# Patient Record
Sex: FEMALE | Race: WHITE | URBAN - METROPOLITAN AREA
[De-identification: names, ages, dates, MRNs, and addresses within clinical notes are randomized per-mention and may not be internally consistent; named-entity substitution may affect disease eponyms.]

---

## 2022-07-31 ENCOUNTER — WEB ENCOUNTER (OUTPATIENT)
Dept: URBAN - METROPOLITAN AREA CLINIC 96 | Facility: CLINIC | Age: 77
End: 2022-07-31

## 2022-08-02 ENCOUNTER — DASHBOARD ENCOUNTERS (OUTPATIENT)
Age: 77
End: 2022-08-02

## 2022-08-02 ENCOUNTER — OFFICE VISIT (OUTPATIENT)
Dept: URBAN - METROPOLITAN AREA CLINIC 96 | Facility: CLINIC | Age: 77
End: 2022-08-02
Payer: COMMERCIAL

## 2022-08-02 VITALS
SYSTOLIC BLOOD PRESSURE: 130 MMHG | TEMPERATURE: 97.9 F | BODY MASS INDEX: 23.39 KG/M2 | OXYGEN SATURATION: 95 % | HEIGHT: 63 IN | HEART RATE: 65 BPM | WEIGHT: 132 LBS | RESPIRATION RATE: 18 BRPM | DIASTOLIC BLOOD PRESSURE: 70 MMHG

## 2022-08-02 DIAGNOSIS — Z85.3 PERSONAL HISTORY OF BREAST CANCER: ICD-10-CM

## 2022-08-02 DIAGNOSIS — Z12.11 ENCOUNTER FOR SCREENING COLONOSCOPY: ICD-10-CM

## 2022-08-02 DIAGNOSIS — K57.90 DIVERTICULOSIS: ICD-10-CM

## 2022-08-02 PROBLEM — 397881000: Status: ACTIVE | Noted: 2022-08-02

## 2022-08-02 PROCEDURE — 99203 OFFICE O/P NEW LOW 30 MIN: CPT | Performed by: INTERNAL MEDICINE

## 2022-08-02 RX ORDER — ATORVASTATIN CALCIUM 20 MG/1
1 TABLET TABLET, FILM COATED ORAL ONCE A DAY
Refills: 0 | Status: ACTIVE | COMMUNITY
Start: 2017-06-09

## 2022-08-02 RX ORDER — TRAMADOL HYDROCHLORIDE 50 MG/1
TABLET ORAL
Qty: 0 | Refills: 0 | Status: ACTIVE | COMMUNITY
Start: 2017-05-30

## 2022-08-02 RX ORDER — PNV NO.95/FERROUS FUM/FOLIC AC 28MG-0.8MG
AS DIRECTED TABLET ORAL
Status: ACTIVE | COMMUNITY

## 2022-08-02 RX ORDER — METOPROLOL TARTRATE 25 MG/1
1 TABLET WITH FOOD TABLET, FILM COATED ORAL TWICE A DAY
Refills: 0 | Status: ACTIVE | COMMUNITY
Start: 2017-04-07

## 2022-08-02 RX ORDER — ANASTROZOLE 1 MG/1
TABLET ORAL
Qty: 0 | Refills: 0 | Status: ON HOLD | COMMUNITY
Start: 2017-05-10

## 2022-08-02 RX ORDER — LEVOTHYROXINE SODIUM 25 UG/1
1 TABLET IN THE MORNING ON AN EMPTY STOMACH TABLET ORAL ONCE A DAY
Status: ACTIVE | COMMUNITY

## 2022-08-02 RX ORDER — BIOTIN 10 MG
1 TABLET TABLET ORAL ONCE A DAY
Status: ACTIVE | COMMUNITY

## 2022-08-02 RX ORDER — ZOLPIDEM TARTRATE 5 MG/1
1 TABLET AT BEDTIME AS NEEDED TABLET, FILM COATED ORAL ONCE A DAY
Refills: 0 | Status: ACTIVE | COMMUNITY
Start: 2017-04-04

## 2022-08-02 RX ORDER — CHOLECALCIFEROL (VITAMIN D3) 25 MCG
1 TABLET TABLET ORAL ONCE A DAY
Status: ACTIVE | COMMUNITY

## 2022-08-02 RX ORDER — B-COMPLEX WITH VITAMIN C
AS DIRECTED TABLET ORAL
Status: ACTIVE | COMMUNITY

## 2022-08-02 RX ORDER — POLYETHYLENE GLYCOL 3350, SODIUM SULFATE, SODIUM CHLORIDE, POTASSIUM CHLORIDE, ASCORBIC ACID, SODIUM ASCORBATE 140-9-5.2G
AS DIRECTED KIT ORAL ONCE
Qty: 1 | Refills: 0 | OUTPATIENT
Start: 2022-08-02 | End: 2022-08-03

## 2022-08-02 RX ORDER — GABAPENTIN 100 MG/1
1 CAPSULE CAPSULE ORAL ONCE A DAY
Status: ACTIVE | COMMUNITY

## 2022-08-02 RX ORDER — VENLAFAXINE HYDROCHLORIDE 150 MG/1
1 CAPSULE WITH FOOD CAPSULE, EXTENDED RELEASE ORAL ONCE A DAY
Status: ACTIVE | COMMUNITY

## 2022-08-02 RX ORDER — CALCIUM CARBONATE 500(1250)
1 TABLET WITH MEALS TABLET ORAL TWICE A DAY
Status: ACTIVE | COMMUNITY

## 2022-08-02 NOTE — PHYSICAL EXAM CHEST:
chest wall non-tender, breathing is unlabored without accessory muscle use, normal breath sounds, scars

## 2022-08-02 NOTE — HPI-TODAY'S VISIT:
Patient being seen in consultation as requested by Dr. Geoff Stafford for screening colonoscopy. A copy of this document will be sent to the requesting physician.  Patient remotely seen for screening colonoscopy performed by myself 7/12/2017.  Few small mouth diverticula in the sigmoid colon.  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  Biopsies of lipoma unremarkable.  Repeat colonoscopy was recommended in 5 years given personal history of breast cancer (2022).  She reports will have occasional diarrhea with peaches,and certain foods. Not a chronic daily issues, only with certain foods. No melena, no rectal bleeding, no unintentional weight loss. No family hx of IBD/GI CA/celiac disease.  UTD with primary MD with recent physical exam.

## 2022-08-19 PROBLEM — 429087003: Status: ACTIVE | Noted: 2022-08-02

## 2022-09-16 ENCOUNTER — OFFICE VISIT (OUTPATIENT)
Dept: URBAN - METROPOLITAN AREA SURGERY CENTER 18 | Facility: SURGERY CENTER | Age: 77
End: 2022-09-16
Payer: COMMERCIAL

## 2022-09-16 DIAGNOSIS — Z12.11 COLON CANCER SCREENING: ICD-10-CM

## 2022-09-16 PROCEDURE — 45378 DIAGNOSTIC COLONOSCOPY: CPT | Performed by: INTERNAL MEDICINE

## 2022-09-16 PROCEDURE — G8907 PT DOC NO EVENTS ON DISCHARG: HCPCS | Performed by: INTERNAL MEDICINE

## 2025-02-25 ENCOUNTER — OFFICE VISIT (OUTPATIENT)
Dept: URBAN - METROPOLITAN AREA CLINIC 96 | Facility: CLINIC | Age: 80
End: 2025-02-25